# Patient Record
Sex: FEMALE | Race: WHITE
[De-identification: names, ages, dates, MRNs, and addresses within clinical notes are randomized per-mention and may not be internally consistent; named-entity substitution may affect disease eponyms.]

---

## 2019-03-11 ENCOUNTER — HOSPITAL ENCOUNTER (EMERGENCY)
Dept: HOSPITAL 26 - MED | Age: 29
Discharge: HOME | End: 2019-03-11
Payer: MEDICAID

## 2019-03-11 VITALS — SYSTOLIC BLOOD PRESSURE: 128 MMHG | DIASTOLIC BLOOD PRESSURE: 70 MMHG

## 2019-03-11 VITALS — DIASTOLIC BLOOD PRESSURE: 72 MMHG | SYSTOLIC BLOOD PRESSURE: 131 MMHG

## 2019-03-11 VITALS — WEIGHT: 223 LBS | HEIGHT: 64 IN | BODY MASS INDEX: 38.07 KG/M2

## 2019-03-11 DIAGNOSIS — O03.9: Primary | ICD-10-CM

## 2019-03-11 LAB
APPEARANCE UR: CLEAR
BASOPHILS # BLD AUTO: 0 K/UL (ref 0–0.22)
BASOPHILS NFR BLD AUTO: 0.7 % (ref 0–2)
BILIRUB UR QL STRIP: NEGATIVE
COLOR UR: YELLOW
EOSINOPHIL # BLD AUTO: 0.1 K/UL (ref 0–0.4)
EOSINOPHIL NFR BLD AUTO: 2.5 % (ref 0–4)
ERYTHROCYTE [DISTWIDTH] IN BLOOD BY AUTOMATED COUNT: 16.2 % (ref 11.6–13.7)
GLUCOSE UR STRIP-MCNC: NEGATIVE MG/DL
HCT VFR BLD AUTO: 40 % (ref 36–48)
HGB BLD-MCNC: 13.3 G/DL (ref 12–16)
HGB UR QL STRIP: (no result)
LEUKOCYTE ESTERASE UR QL STRIP: NEGATIVE
LYMPHOCYTES # BLD AUTO: 1.7 K/UL (ref 2.5–16.5)
LYMPHOCYTES NFR BLD AUTO: 29.3 % (ref 20.5–51.1)
MCH RBC QN AUTO: 27 PG (ref 27–31)
MCHC RBC AUTO-ENTMCNC: 33 G/DL (ref 33–37)
MCV RBC AUTO: 82.1 FL (ref 80–94)
MONOCYTES # BLD AUTO: 0.3 K/UL (ref 0.8–1)
MONOCYTES NFR BLD AUTO: 5.7 % (ref 1.7–9.3)
NEUTROPHILS # BLD AUTO: 3.5 K/UL (ref 1.8–7.7)
NEUTROPHILS NFR BLD AUTO: 61.8 % (ref 42.2–75.2)
NITRITE UR QL STRIP: NEGATIVE
PH UR STRIP: 6 [PH] (ref 5–9)
PLATELET # BLD AUTO: 321 K/UL (ref 140–450)
RBC # BLD AUTO: 4.87 MIL/UL (ref 4.2–5.4)
RBC #/AREA URNS HPF: (no result) /HPF (ref 0–5)
WBC # BLD AUTO: 5.7 K/UL (ref 4.8–10.8)
WBC,URINE: (no result) /HPF (ref 0–5)

## 2019-03-11 PROCEDURE — 85025 COMPLETE CBC W/AUTO DIFF WBC: CPT

## 2019-03-11 PROCEDURE — 36415 COLL VENOUS BLD VENIPUNCTURE: CPT

## 2019-03-11 PROCEDURE — 76801 OB US < 14 WKS SINGLE FETUS: CPT

## 2019-03-11 PROCEDURE — 81001 URINALYSIS AUTO W/SCOPE: CPT

## 2019-03-11 PROCEDURE — 99284 EMERGENCY DEPT VISIT MOD MDM: CPT

## 2019-03-11 PROCEDURE — 84702 CHORIONIC GONADOTROPIN TEST: CPT

## 2019-03-11 NOTE — NUR
PATIENT PRESENTS TO ED WITH C/O VAGINAL BLEEING, SPOTTING WITH LOWER ABDOMINAL 
SORENESS X 2 DAYS, DENIES INJURY,DENIES DYSURIA, G 1 P 0  APPROX 11 WKS 
IUP,POSSIBLE LMP 12/15/2018, SEEN BY OB/GYN TODAY ; TOLD NO FETUS SEEN OR FETAL 
HEART BEAT  .  AAOX4 WITH EVEN AND STEADY GAIT; LUNGS CLEAR BL; HR EVEN AND 
REGULAR; PT DENIES ANY FEVER, CP, SOB, OR COUGH AT THIS TIME; DENIES N/V/D; 
SKIN IS PINK/WARM/DRY; PATIENT STATES PAIN OF 6/10 AT THIS TIME; VSS; PATIENT 
POSITIONED FOR COMFORT; HOB ELEVATED; BEDRAILS UP X2; BED DOWN. ER MD MADE 
AWARE OF PT STATUS.